# Patient Record
Sex: FEMALE | Race: BLACK OR AFRICAN AMERICAN
[De-identification: names, ages, dates, MRNs, and addresses within clinical notes are randomized per-mention and may not be internally consistent; named-entity substitution may affect disease eponyms.]

---

## 2018-02-02 NOTE — EMERGENCY ROOM REPORT
History of Present Illness


General


Chief Complaint:  Sore Throat


Source:  Patient





Present Illness


HPI


Is a 37-year-old female with no past medical history.  She presents with chief 

complaint of sore throat and raspiness for about a week and a half.  Also 

occasional headache.  No fever or chills but no nausea no vomiting.  No sick 

contact.  Has not take anything for this.


Allergies:  


Coded Allergies:  


     No Known Allergies (Unverified , 18)





Patient History


Past Medical History:  see triage record, old chart reviewed


Past Surgical History:  none


Pertinent Family History:  none


Social History:  Denies: smoking


Last Menstrual Period:  18


Pregnant Now:  No


:  0


Para:  0


Immunizations:  other


Reviewed Nursing Documentation:  PMH: Agreed, PSxH: Agreed





Nursing Documentation-PM


Past Medical History:  No History, Except For


History Of Psychiatric Problem:  Yes - anxiety, depression





Review of Systems


Eye:  Denies: eye pain, blurred vision


ENT:  Reports: throat pain, Denies: ear pain, nose congestion, throat swelling


Respiratory:  Denies: cough, shortness of breath


Cardiovascular:  Denies: chest pain, palpitations


Gastrointestinal:  Denies: abdominal pain, diarrhea, nausea, vomiting


Musculoskeletal:  Denies: back pain, joint pain


Skin:  Denies: rash


Neurological:  Denies: headache, numbness


Endocrine:  Denies: increased thirst, increased urine


Hematologic/Lymphatic:  Denies: easy bruising


All Other Systems:  negative except mentioned in HPI





Physical Exam





Vital Signs








  Date Time  Temp Pulse Resp B/P (MAP) Pulse Ox O2 Delivery O2 Flow Rate FiO2


 


18 20:45 98.2 77 14 125/73 98 Room Air  





vitals normal


Sp02 EP Interpretation:  reviewed, normal


General Appearance:  well appearing, no apparent distress, alert


Head:  normocephalic, atraumatic


Eyes:  bilateral eye PERRL, bilateral eye EOMI


ENT:  hearing grossly normal, normal pharynx


Neck:  full range of motion, supple, no meningismus


Respiratory:  chest non-tender, lungs clear, normal breath sounds


Cardiovascular #1:  regular rate, rhythm, no murmur


Gastrointestinal:  normal bowel sounds, non tender, no mass, no organomegaly, 

no bruit, non-distended


Musculoskeletal:  back normal, gait/station normal, normal range of motion


Psychiatric:  mood/affect normal


Skin:  warm/dry





Medical Decision Making


Diagnostic Impression:  


 Primary Impression:  


 Sore throat


ER Course


Patient presents with a viral infection.  No evidence of strep throat.  No 

evidence of rest or distress.  No evidence of any bacterial infection.  We'll 

discharge home.





Last Vital Signs








  Date Time  Temp Pulse Resp B/P (MAP) Pulse Ox O2 Delivery O2 Flow Rate FiO2


 


18 20:45 98.2 77 14 125/73 98 Room Air  








Status:  unchanged


Disposition:  HOME, SELF-CARE


Condition:  Stable


Scripts


Ibuprofen* (MOTRIN*) 600 Mg Tablet


600 MG ORAL Q8H Y for For Pain, #30 TAB 0 Refills


   Prov: SCARLETT POLLACK M.D.         18


Patient Instructions:  Sore Throat





Additional Instructions:  


Followup with your Dr. 7 days.  Saltwater gargle.  Return if worse.











SCARLETT POLLACK M.D. 2018 21:14

## 2020-03-08 ENCOUNTER — HOSPITAL ENCOUNTER (EMERGENCY)
Dept: HOSPITAL 72 - EMR | Age: 40
Discharge: HOME | End: 2020-03-08
Payer: MEDICARE

## 2020-03-08 VITALS — WEIGHT: 130 LBS | HEIGHT: 66 IN | BODY MASS INDEX: 20.89 KG/M2

## 2020-03-08 VITALS — DIASTOLIC BLOOD PRESSURE: 74 MMHG | SYSTOLIC BLOOD PRESSURE: 104 MMHG

## 2020-03-08 VITALS — SYSTOLIC BLOOD PRESSURE: 104 MMHG | DIASTOLIC BLOOD PRESSURE: 74 MMHG
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
## 2020-03-08 DIAGNOSIS — S43.402A: Primary | ICD-10-CM

## 2020-03-08 DIAGNOSIS — S90.32XA: ICD-10-CM
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
## 2020-03-08 PROCEDURE — 99283 EMERGENCY DEPT VISIT LOW MDM: CPT

## 2020-03-08 NOTE — EMERGENCY ROOM REPORT
History of Present Illness


General


Chief Complaint:  Multiple Trauma/Fall


Source:  Patient





Present Illness


HPI


39-year-old female with no significant past medical history here complaining of 

multiple trauma that occurred yesterday.  Patient reports that she landed on 

the left side of her body complaining of left shoulder pain, left foot pain.  

Has full range of motion of all extremities.  No bony tenderness noted.  No 

impingement sign noted.  Patient has not taken medication for symptom relief.  

Rates the pain 3 out of 10 without radiation.  Denies tingling or numbness.  

Denies pregnancy.  Denies other associated symptoms.  Denies loss of 

consciousness and headache.  Denies any head trauma.


Allergies:  


Coded Allergies:  


     No Known Allergies (Unverified , 2/2/18)





Patient History


Past Medical History:  see triage record


Past Surgical History:  none


Pertinent Family History:  none


Last Menstrual Period:  02/2020


Pregnant Now:  No


Immunizations:  UTD


Reviewed Nursing Documentation:  PMH: Agreed; PSxH: Agreed





Nursing Documentation-PMH


Past Medical History:  No Stated History





Review of Systems


All Other Systems:  negative except mentioned in HPI





Physical Exam





Vital Signs








  Date Time  Temp Pulse Resp B/P (MAP) Pulse Ox O2 Delivery O2 Flow Rate FiO2


 


3/8/20 20:50 97.5 73 16 104/74 (84) 100 Room Air  








Sp02 EP Interpretation:  reviewed, normal


General Appearance:  no apparent distress, alert, GCS 15, non-toxic


Head:  normocephalic, atraumatic


Eyes:  bilateral eye normal inspection, bilateral eye PERRL


ENT:  hearing grossly normal, normal pharynx, no angioedema, normal voice


Neck:  full range of motion, supple/symm/no masses


Respiratory:  chest non-tender, lungs clear, normal breath sounds, no rhonchi, 

no respiratory distress, speaking full sentences


Cardiovascular #1:  regular rate, rhythm, no edema, no murmur


Cardiovascular #2:  2+ carotid (R), 2+ carotid (L), 2+ radial (R), 2+ radial (L)

, 2+ dorsalis pedis (R), 2+ dorsalis pedis (L)


Gastrointestinal:  normal bowel sounds, non tender, soft, non-distended, no 

guarding, no rebound


Genitourinary:  no CVA tenderness


Musculoskeletal:  back normal, normal range of motion, digits/nails normal, no 

calf tenderness, pelvis stable, non-tender, other - no impingement sign noted


Neurologic:  alert, motor strength/tone normal, oriented x3, sensory intact, 

responsive, speech normal


Psychiatric:  judgement/insight normal, memory normal, mood/affect normal, no 

suicidal/homicidal ideation


Skin:  no rash


Lymphatic:  no adenopathy





Medical Decision Making


PA Attestation


All diagnoses and treatment plans were reviewed and discussed with my 

supervising physician Dr. Hagan


Diagnostic Impression:  


 Primary Impression:  


 Shoulder sprain


 Additional Impression:  


 Foot contusion


ER Course


39-year-old female with no significant past medical history here complaining of 

multiple trauma that occurred yesterday.  Patient reports that she landed on 

the left side of her body complaining of left shoulder pain, left foot pain.  

Has full range of motion of all extremities.  No bony tenderness noted.  No 

impingement sign noted.  Patient has not taken medication for symptom relief.  

Rates the pain 3 out of 10 without radiation.  Denies tingling or numbness.  

Denies pregnancy.  Denies other associated symptoms.  Denies loss of 

consciousness and headache.  Denies any head trauma.





Ddx considered but are not limited to: foot fracture, foot sprain, foot 

contusion, foot strain, shoulder sprain, shoulder strain, shoulder fracture, 

rotator cuff tear














Vital signs: are WNL, pt. is afebrile








 H&PE are most consistent with: Left shoulder sprain, left foot contusion














ORDERS: foot Xray, no shoulder x-ray necessary


Patient has pharyngeal motion, no bony tenderness noted, no impingement sign 

noted, Motrin, Robaxin














ED INTERVENTIONS: None required at this time.























DISCHARGE: At this time pt. is stable for d/c to home. Will provide printed 

patient care instructions, and any necessary prescriptions. Care plan and 

follow up instructions have been discussed with the patient prior to discharge.

  Avoid strenuous physical activity with affected side, take medication as 

directed, alternate between icing and heating affected area, follow-up with 

primary care provider, if worsening symptoms return to the emergency room


Other X-Ray Diagnostic Results


Other X-Ray Diagnostic Results :  


   X-Ray ordered:  left foot


   # of Views/Limited Vs Complete:  3 View


   Indication:  Pain


   EP Interpretation:  Yes


   PA Xray:  Interpretation reviewed, by supervising MD, and agrees with 

findings.


   Interpretation:  no dislocation, no soft tissue swelling, no fractures


   Impression:  No acute disease


   Electronically Signed by:  Nahal Saheli PA-C





Last Vital Signs








  Date Time  Temp Pulse Resp B/P (MAP) Pulse Ox O2 Delivery O2 Flow Rate FiO2


 


3/8/20 20:58 97.5 73 16 104/74 100 Room Air  








Disposition:  HOME, SELF-CARE


Condition:  Stable


Referrals:  


[facility],REFERRING (PCP)


Patient Instructions:  Foot Contusion, Easy-to-Read, Shoulder Sprain





Additional Instructions:  


Avoid strenuous physical activity with affected side, take medication as 

directed, alternate between icing and heating affected area, follow-up with 

primary care provider, if worsening symptoms return to the emergency room











Matilda Olivo Mar 8, 2020 21:16

## 2020-10-21 ENCOUNTER — HOSPITAL ENCOUNTER (EMERGENCY)
Dept: HOSPITAL 72 - EMR | Age: 40
Discharge: HOME | End: 2020-10-21
Payer: MEDICARE

## 2020-10-21 VITALS — DIASTOLIC BLOOD PRESSURE: 58 MMHG | SYSTOLIC BLOOD PRESSURE: 105 MMHG

## 2020-10-21 VITALS — SYSTOLIC BLOOD PRESSURE: 110 MMHG | DIASTOLIC BLOOD PRESSURE: 62 MMHG

## 2020-10-21 VITALS — HEIGHT: 66 IN | BODY MASS INDEX: 21.21 KG/M2 | WEIGHT: 132 LBS
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
## 2020-10-21 DIAGNOSIS — S63.501A: Primary | ICD-10-CM

## 2020-10-21 PROCEDURE — 99283 EMERGENCY DEPT VISIT LOW MDM: CPT

## 2020-10-21 NOTE — NUR
ED Nurse Note:



Pt placed in TX 1. pt states that she fell while attempting to get up from 
sitting in tub using right wrist to brace the fall. Pt states she felt pain at 
the time but reports no pain at this time. Pt states she "just wants an xray to 
look for anything broken." No swelling or redness noted at this time, pt denies 
decrease in ROM. Pt aao x 4, ambulates with steady gait. VSS no ss of distress 
noted. will continue to monitor. Awaiting ERMD at bedside.

## 2020-10-21 NOTE — EMERGENCY ROOM REPORT
History of Present Illness


General


Chief Complaint:  Upper Extremity Injury


Source:  Patient





Present Illness


HPI


This patient states that yesterday she tripped and fell while stepping up a curb

to get on a bus.  She states when she fell backwards she put out her right hand.

 She states she noted when she went to push off her hand earlier today she had 

sudden and severe pain in the right wrist.  She states that she does not have 

pain currently.  She denies tingling or numbness.  She denies any other injury. 

She has no other complaints.


Allergies:  


Coded Allergies:  


     No Known Allergies (Unverified , 2/2/18)





COVID-19 Screening


Contact w/high risk pt:  No


Experienced COVID-19 symptoms?:  No


COVID-19 Testing performed PTA:  No





Patient History


Past Medical History:  none


Social History:  Denies: smoking, alcohol use, drug use


Last Menstrual Period:  9-21


Pregnant Now:  No


Reviewed Nursing Documentation:  PMH: Agreed; PSxH: Agreed





Nursing Documentation-PMH


Past Medical History:  No Stated History





Review of Systems


All Other Systems:  negative except mentioned in HPI





Physical Exam





Vital Signs








  Date Time  Temp Pulse Resp B/P (MAP) Pulse Ox O2 Delivery O2 Flow Rate FiO2


 


10/21/20 18:33 97.9 69 20 105/58 (74) 98 Room Air  








Sp02 EP Interpretation:  reviewed, normal


General Appearance:  no apparent distress, alert, GCS 15, non-toxic


Head:  normocephalic, atraumatic


Eyes:  bilateral eye normal inspection


ENT:  hearing grossly normal, no angioedema, normal voice


Neck:  normal inspection


Respiratory:  no respiratory distress, no retraction, no accessory muscle use, 

speaking full sentences


Rectal:  deferred


Musculoskeletal:  back normal, normal range of motion, gait/station normal, non-

tender, other - R. wrist slightly ttp over anterior wrist.


Neurologic:  alert, motor strength/tone normal, oriented x3, sensory intact, 

responsive, speech normal


Psychiatric:  judgement/insight normal, memory normal, mood/affect normal, no 

suicidal/homicidal ideation


Skin:  no rash, normal color





Procedures


Splinting


Splinting :  


   Consent:  Verbal


   Location:  R. wrist


   Pre-Made Type:  velcro


   Splint:  volar


   Pre-Proc Neuro Vasc Exam:  normal


   Post-Proc Neuro Vasc Exam:  normal


   Patient Tolerated:  Well


   Complications:  None





Medical Decision Making


Diagnostic Impression:  


   Primary Impression:  


   Right wrist sprain


ER Course


This patient has a clinical presentation consistent with wrist sprain.  X-ray of

the right wrist shows no acute fracture.  The patient was given a manufactured 

splint for comfort.  No emergency medical condition was identified.  The patient

given close return precautions and follow-up instructions.





I warned the patient that plain x-rays have a significant false-negative rate.  

Factors, significant soft tissue injuries, and other pathology may be present 

even though not seen on x-ray.  Injuries serious enough to ultimately require 

surgery may be present with normal x-rays.  This can occur because some 

fractures or not initially visible on plain film x-rays or, rarely, the ra

diologist may discover a subtle fracture that I missed on my preliminary read.  

It was explained that close outpatient followup is required to evaluate this 

possibility.  If all symptoms resolve or improve significantly in the coming 

weeks, no further testing is needed.  However, if the pain/symptoms persist, 

additional studies such as MRI/CT or repeat x-ray would be needed to rule out 

the possibility of serious soft tissue injury.  The patient agreed to followup 

as directed.


Other X-Ray Diagnostic Results


Other X-Ray Diagnostic Results :  


   X-Ray ordered:  R. wrist


   # of Views/Limited Vs Complete:  Complete


   EP Interpretation:  Yes


   Interpretation:  no fractures


   Impression:  No acute disease


   Electronically Signed by:  Nora Carvalho DO





Last Vital Signs








  Date Time  Temp Pulse Resp B/P (MAP) Pulse Ox O2 Delivery O2 Flow Rate FiO2


 


10/21/20 18:33 97.9 69 20 105/58 (74) 98 Room Air  








Status:  improved


Disposition:  HOME, SELF-CARE


Condition:  Improved











Nora Carvalho DO            Oct 21, 2020 20:22

## 2020-10-21 NOTE — NUR
ER DISCHARGE NOTE:

Patient is cleared to be discharged home with wrist brace per ERMD, pt is aox4, 
98% on room air, with stable vital signs. pt was given dc instructions, pt was 
able to verbalize understanding, pt id band removed without complications. pt 
is able to ambulate with steady gait. pt took all belongings.

## 2020-10-22 NOTE — DIAGNOSTIC IMAGING REPORT
INDICATION: Wrist pain

 

TECHNIQUE: XRAY Wrist Complete R frontal, lateral, and oblique views of the right

wrist

 

COMPARISON: None 

 

FINDINGS:

 

 There is no acute fracture or dislocation. Joint spaces are maintained. No acute

soft tissue abnormality. 

 

IMPRESSION: 

 

No acute fracture or dislocation.

## 2020-11-23 ENCOUNTER — HOSPITAL ENCOUNTER (EMERGENCY)
Dept: HOSPITAL 72 - EMR | Age: 40
Discharge: HOME | End: 2020-11-23
Payer: MEDICARE

## 2020-11-23 VITALS — DIASTOLIC BLOOD PRESSURE: 66 MMHG | SYSTOLIC BLOOD PRESSURE: 100 MMHG

## 2020-11-23 VITALS — WEIGHT: 132 LBS | HEIGHT: 66 IN | BODY MASS INDEX: 21.21 KG/M2

## 2020-11-23 DIAGNOSIS — S63.613A: Primary | ICD-10-CM
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
## 2020-11-23 PROCEDURE — 99283 EMERGENCY DEPT VISIT LOW MDM: CPT

## 2020-11-23 NOTE — EMERGENCY ROOM REPORT
History of Present Illness


General


Chief Complaint:  Upper Extremity Injury


Source:  Patient





Present Illness


HPI


40-year-old female with no signal past medical history here complaining of pain 

in left thumb and middle finger x1 week.  Patient does not recall how it 

happened however reports that he "might have sprained it.".  Has not taken 

medication for symptom relief.  Has full range of motion, is neurovascularly 

intact and denies any tingling numbness.  Has full strength of the upper 

extremities.  Denies pregnancy.


Allergies:  


Coded Allergies:  


     No Known Allergies (Unverified , 2/2/18)





COVID-19 Screening


Contact w/high risk pt:  No


Experienced COVID-19 symptoms?:  No


COVID-19 Testing performed PTA:  No





Patient History


Past Medical History:  see triage record


Past Surgical History:  none


Pertinent Family History:  none


Last Menstrual Period:  now


Pregnant Now:  No


Immunizations:  UTD


Reviewed Nursing Documentation:  PMH: Agreed; PSxH: Agreed





Nursing Documentation-PMH


Past Medical History:  No Stated History





Review of Systems


All Other Systems:  negative except mentioned in HPI





Physical Exam





Vital Signs








  Date Time  Temp Pulse Resp B/P (MAP) Pulse Ox O2 Delivery O2 Flow Rate FiO2


 


11/23/20 19:44 98.2 72 20 100/66 (77) 98 Room Air  








Sp02 EP Interpretation:  reviewed, normal


General Appearance:  no apparent distress, alert, GCS 15, non-toxic


Head:  normocephalic, atraumatic


Eyes:  bilateral eye normal inspection, bilateral eye PERRL


ENT:  hearing grossly normal, normal pharynx, no angioedema, normal voice


Neck:  full range of motion, supple/symm/no masses


Respiratory:  chest non-tender, lungs clear, normal breath sounds, speaking full

sentences


Cardiovascular #1:  regular rate, rhythm, no edema


Cardiovascular #2:  2+ radial (R), 2+ radial (L)


Gastrointestinal:  normal bowel sounds, non tender, soft, non-distended, no 

guarding, no rebound


Rectal:  deferred


Genitourinary:  no CVA tenderness


Musculoskeletal:  back normal, non-tender


Neurologic:  alert, motor strength/tone normal, oriented x3, sensory intact, 

responsive, speech normal


Psychiatric:  judgement/insight normal, memory normal, mood/affect normal, no 

suicidal/homicidal ideation


Skin:  no rash


Lymphatic:  no adenopathy





Procedures


Splinting


Splinting :  


   Consent:  Verbal


   Location:  Left middle finger


   Pre-Made Type:  metal


   Pre-Proc Neuro Vasc Exam:  normal


   Post-Proc Neuro Vasc Exam:  normal


   Patient Tolerated:  Well


   Complications:  None





Medical Decision Making


PA Attestation


All my diagnosis and treatment plans were reviewed ad discussed with my [facility]

[facility] physician Dr. Ramos


Diagnostic Impression:  


   Primary Impression:  


   Finger sprain


ER Course


40-year-old female with no signal past medical history here complaining of pain 

in left thumb and middle finger x1 week.  Patient does not recall how it 

happened however reports that he "might have sprained it.".  Has not taken 

medication for symptom relief.  Has full range of motion, is neurovascularly 

intact and denies any tingling numbness.  Has full strength of the upper 

extremities.  Denies pregnancy.





Ddx considered but are not limited to: Hand sprain, hand sprain, hand fracture





Vital signs: are WNL, pt. is afebrile





H&PE are most consistent with : Finger sprain





ORDERS: Hand x-ray, Robaxin, Motrin


ED INTERVENTIONS: Metal splint applied middle finger.





DISCHARGE: At this time pt. is stable for d/c to home. Will provide printed 

patient care instructions, and any necessary prescriptions. Care plan and follow

up instructions have been discussed with the patient prior to discharge.  

Patient take medication as directed, follow primary care provider, worsening 

symptoms return to the emergency room


Other X-Ray Diagnostic Results


Other X-Ray Diagnostic Results :  


   X-Ray ordered:  left hand


   # of Views/Limited Vs Complete:  3 View


   Indication:  Pain


   EP Interpretation:  Yes


   PA Xray:  Interpretation reviewed, by supervising MD, and agrees with 

findings.


   Interpretation:  no dislocation, no soft tissue swelling, no fractures


   Impression:  No acute disease


   Electronically Signed by:  Matilda Verde PA-C





Last Vital Signs








  Date Time  Temp Pulse Resp B/P (MAP) Pulse Ox O2 Delivery O2 Flow Rate FiO2


 


11/23/20 19:44 98.2 72 20 100/66 (77) 98 Room Air  








Disposition:  HOME, SELF-CARE


Condition:  Stable


Scripts


Ibuprofen* (MOTRIN*) 600 Mg Tablet


600 MG ORAL Q6H PRN for FOR PAIN, #20 TAB 0 Refills


   Prov: Matilda Olivo         11/23/20 


Methocarbamol* (ROBAXIN-500*) 500 Mg Tablet


500 MG ORAL TID PRN for For Pain, #15 TAB 0 Refills


   Prov: Matilda Olivo         11/23/20


Patient Instructions:  Finger Sprain, Easy-to-Read





Additional Instructions:  


Take medication as directed, follow primary care provider, if worsening symptoms

return to the emergency room











Matilda Olivo      Nov 23, 2020 20:33

## 2020-11-23 NOTE — NUR
ED Nurse Note:



Patient walked in to ER from home with c/o left thumb and middle finger pain 
onset 2 weeks ago. Patient denies injury or trauma. Skin is intact, no 
swelling/redness noted. Pt denies fever and chills, N&V

## 2020-11-24 NOTE — DIAGNOSTIC IMAGING REPORT
Indication: Left thumb and middle finger pain x2 weeks

 

Technique: 3 views left hand

 

Comparison: none

 

Findings: No acute fracture. No dislocation. Joint spaces are preserved. No

radiopaque foreign body

 

Impression: Negative